# Patient Record
Sex: FEMALE
[De-identification: names, ages, dates, MRNs, and addresses within clinical notes are randomized per-mention and may not be internally consistent; named-entity substitution may affect disease eponyms.]

---

## 2021-12-31 ENCOUNTER — APPOINTMENT (OUTPATIENT)
Dept: AFTER HOURS CARE | Facility: EMERGENCY ROOM | Age: 36
End: 2021-12-31
Payer: MEDICAID

## 2021-12-31 DIAGNOSIS — U07.1 COVID-19: ICD-10-CM

## 2021-12-31 PROCEDURE — 99202 OFFICE O/P NEW SF 15 MIN: CPT | Mod: 95

## 2022-01-06 PROBLEM — Z00.00 ENCOUNTER FOR PREVENTIVE HEALTH EXAMINATION: Status: ACTIVE | Noted: 2022-01-06

## 2022-01-11 PROBLEM — U07.1 COVID-19: Status: ACTIVE | Noted: 2022-01-11

## 2022-02-15 NOTE — PLAN
[No new medications perscribed] : Treat in place: No new medications prescribed [FreeTextEntry1] : 1)	recommend Ibuprofen 600mg [id number] for pain\par 2)	follow up with PMD for CXR\par 3)	Pt encouraged to hydrate with Gatorade\par

## 2022-02-15 NOTE — PHYSICAL EXAM
[de-identified] : O2Sat 95% \par 74 HR\par General: pt appears stated age, and is not in distress, speaking in full clear sentences\par HEENT: AT/NC, pink conjunctiva, anicteric sclerae, EOMI, mmm\par Neck: supple, full ROM, trachea midline\par Lungs: symmetric excursion, no respiratory distress, no tachypnea\par Extremities: no clubbing, cyanosis, or edema visualized and no obvious deformities or fractures\par Skin: no rashes, petechiae, ecchymoses, or jaundice\par Neuro: awake, alert, responsive; cranial nerves grossly intact, EOMI intact jaw movement, no facial asymmetry, hearing intact\par

## 2022-02-15 NOTE — HISTORY OF PRESENT ILLNESS
[Home] : at home, [unfilled] , at the time of the visit. [Verbal consent obtained from patient] : the patient, [unfilled] [Other Location: e.g. Home (Enter Location, City,State)___] : at [unfilled] [FreeTextEntry8] : 37 Yo f hx of asthma, diagnosed w/ covid on the 12/15/21 now c/o back pain. Pt reports sharp pain in the center of her back. She has a cough productive of brown sputum. She denies any f/c/ns, dyspnea, wheezing, CP, palpitations, or other complaints. Her sister is a nurse and examined her earlier and told her she heard crackles in her lungs.

## 2022-02-15 NOTE — ASSESSMENT
[FreeTextEntry1] : Pt w/ aforementioned presentation concerning for but not limited to covid19 URI, covid pna, msk pain. She has no hypoxia or dyspnea or CP or tachycardia or respiratory distress to necessitate hospitalization